# Patient Record
Sex: FEMALE | Race: WHITE | NOT HISPANIC OR LATINO | URBAN - METROPOLITAN AREA
[De-identification: names, ages, dates, MRNs, and addresses within clinical notes are randomized per-mention and may not be internally consistent; named-entity substitution may affect disease eponyms.]

---

## 2022-03-29 ENCOUNTER — LAB REQUISITION (OUTPATIENT)
Dept: LAB | Facility: HOSPITAL | Age: 67
End: 2022-03-29

## 2022-03-29 DIAGNOSIS — M65.30 TRIGGER FINGER, UNSPECIFIED FINGER: ICD-10-CM

## 2022-03-29 PROCEDURE — 88305 TISSUE EXAM BY PATHOLOGIST: CPT | Performed by: PLASTIC SURGERY

## 2022-03-30 LAB
CYTO UR: NORMAL
LAB AP CASE REPORT: NORMAL
LAB AP CLINICAL INFORMATION: NORMAL
PATH REPORT.FINAL DX SPEC: NORMAL
PATH REPORT.GROSS SPEC: NORMAL

## 2024-07-31 PROBLEM — M19.90 OSTEOARTHRITIS: Status: ACTIVE | Noted: 2024-07-31

## 2024-08-01 PROBLEM — M79.10 MYALGIA: Status: ACTIVE | Noted: 2024-08-01

## 2024-08-01 PROBLEM — M85.89 OSTEOPENIA OF MULTIPLE SITES: Status: ACTIVE | Noted: 2024-08-01

## 2024-08-01 PROBLEM — M19.90 OSTEOARTHROPATHY: Status: ACTIVE | Noted: 2024-08-01

## 2024-08-01 NOTE — ASSESSMENT & PLAN NOTE
Hands, knees, spine, feet , shoulder.  Currently c/o pain in R knee and R shoulder.  Positive R lateral knee pain, R thumb,  Xray r shoulder-degenerative change of the R AC joint and significant narrowing of the glenohumeral  joint consistent with degenerative arthritis  Xray R knee- patella femoral narrowing   Hand film: degenerative changes  of the DIP and CMC joint     Plan:    Comfort cool splints.   Biofreeze, CBD, Voltaren gel, Tiger Balm.   Tylenol Arthritis 2 tablets bid prn.   Continue Live Well.   If her pain worsens she can call and ask to schedule injection.

## 2024-08-01 NOTE — ASSESSMENT & PLAN NOTE
Dexa scan 2/21 Tscore Spine L1 -1.2, L4 is -1.6. L femoral neck -2.0, R femoral neck -2.0; L total hip -1.3.    Plan:  We discussed treatment options today.  Calcium 800-1000mg daily. Not to exceed 1200mg. Split dose  Vitamin D3 gel caps OTC 1000IU daily.   Weight bearing exercise.   Per the pt she had recent DEXA with PCP.

## 2024-08-01 NOTE — ASSESSMENT & PLAN NOTE
Cramping that occurs mostly at night.  Bilateral LE to thighs- currently doing well     Plan:    She stopped her statin and started taking potassium with much improvement.

## 2024-08-02 ENCOUNTER — OFFICE VISIT (OUTPATIENT)
Age: 69
End: 2024-08-02
Payer: COMMERCIAL

## 2024-08-02 VITALS
TEMPERATURE: 97.8 F | SYSTOLIC BLOOD PRESSURE: 112 MMHG | WEIGHT: 118 LBS | HEIGHT: 59 IN | BODY MASS INDEX: 23.79 KG/M2 | HEART RATE: 68 BPM | DIASTOLIC BLOOD PRESSURE: 78 MMHG

## 2024-08-02 DIAGNOSIS — M85.89 OSTEOPENIA OF MULTIPLE SITES: ICD-10-CM

## 2024-08-02 DIAGNOSIS — M79.10 MYALGIA: ICD-10-CM

## 2024-08-02 DIAGNOSIS — M19.90 OSTEOARTHROPATHY: Primary | ICD-10-CM

## 2024-08-02 NOTE — PROGRESS NOTES
[facility] [place] [facility] Follow Up Visit     Office Follow Up      Date: 08/02/2024   Patient Name: Nanci Bell  MRN: [id number]  YOB: 1955    Referring Physician: Mariaa Overton MD     Chief Complaint   Patient presents with    Follow-up    Osteoarthritis       History of Present Illness: Nanci Bell is a 69 y.o. female who is here today for follow up on   History of Present Illness  The patient presents for evaluation of multiple medical concerns.    The patient reports experiencing morning stiffness, which subsides upon movement. However, prolonged periods of sitting induce a sensation of gimpiness upon standing. She quantifies her pain as 2 out of 10 today, primarily localized to the side of her hip. The pain intensifies upon awakening, particularly after sleeping on her left side. She speculates that prolonged sitting in one position exacerbates the pain.    The patient experienced an episode of severe cramping last night, which persisted despite her efforts to walk. She speculates that this may be related to her depletion of her calcium supplement supply. She has been managing her symptoms with Caltrate.    The patient has a history of osteoarthritis in her hands, knees, spine, feet, and shoulder. She reports discomfort in her right thumb, with the right hand being more affected than the left. She has attempted to use a compression glove, but found it unhelpful. She experiences intermittent shooting pains, which resolve spontaneously. She does not use Tylenol Arthritis.    The patient maintains a daily walking regimen, albeit inconsistently.    Supplemental Information  She always has hyperlipidemia.       Result Review :        Results            Subjective     Allergies   Allergen Reactions    Codeine Provider Review Needed         Current Outpatient Medications:     fexofenadine (ALLEGRA) 180 MG tablet, Take 1 tablet by mouth Daily., Disp: , Rfl:      traMADol (ULTRAM) 50 MG tablet, Take 1 tablet by mouth Every 6 (Six) Hours As Needed for pain (Patient not taking: Reported on 2024), Disp: 15 tablet, Rfl: 0    Past Medical History:   Diagnosis Date    Anemia     Arthritis     High cholesterol     Nocturia     Osteopenia         Past Surgical History:   Procedure Laterality Date    HYSTERECTOMY      TRIGGER FINGER RELEASE Left     LEFT 3RD FINGER REPAIR    WISDOM TOOTH EXTRACTION         Family History   Problem Relation Age of Onset    Hypertension Mother     Arthritis Mother     Hypertension Brother     Diabetes Maternal Grandfather     Pancreatic cancer Paternal Grandmother     Other (LYMPHOAMTOID PAPULOSIS) Other         Social History     Socioeconomic History    Marital status:    Tobacco Use    Smoking status: Former     Types: Cigarettes     Start date:      Quit date:      Years since quittin.6     Passive exposure: Past    Smokeless tobacco: Never   Vaping Use    Vaping status: Never Used   Substance and Sexual Activity    Alcohol use: Yes     Comment: rare    Drug use: Never    Sexual activity: Defer       Review of Systems   Constitutional:  Positive for unexpected weight gain. Negative for fatigue and fever.   HENT:  Negative for mouth sores, nosebleeds, swollen glands and trouble swallowing.    Eyes:  Negative for blurred vision, double vision, photophobia, pain and visual disturbance.   Respiratory:  Negative for apnea, cough, choking and shortness of breath.    Cardiovascular:  Negative for chest pain, palpitations and leg swelling.        Leg cramping    Gastrointestinal:  Negative for abdominal pain, blood in stool, constipation, diarrhea, nausea, vomiting and GERD.   Endocrine: Negative for cold intolerance, heat intolerance, polydipsia, polyphagia and polyuria.   Genitourinary:  Negative for difficulty urinating, dysuria, genital sores, hematuria and urinary incontinence.   Musculoskeletal:  Positive for neck stiffness.  Negative for arthralgias, back pain, gait problem, joint swelling, myalgias, neck pain and bursitis.   Skin:  Negative for rash.   Allergic/Immunologic: Negative for environmental allergies and food allergies.   Neurological:  Negative for dizziness, tremors, seizures, syncope, weakness, numbness, headache, memory problem and confusion.   Hematological:  Negative for adenopathy. Does not bruise/bleed easily.   Psychiatric/Behavioral:  Negative for sleep disturbance, suicidal ideas, depressed mood and stress. The patient is nervous/anxious.         I have reviewed and updated the patient's chief complaint, history of present illness, review of systems, past medical history, surgical history, family history, social history, medications and allergy list as appropriate.     Objective    Vital Signs:   There were no vitals filed for this visit.  Nanci Bell reports a pain score of .  Given her pain assessment as noted, treatment options were discussed and the following options were decided upon as a follow-up plan to address the patient's pain: .      There is no height or weight on file to calculate BMI.  BMI cannot be calculated due to outdated height or weight values.  Please input a current height/weight in Vitals and re-renter BMIFOLLOWUP in Note to pull in correct documentation based on BMI range.      Physical Exam   Physical Exam  Patient is an alert female, in no acute distress.  Head is atraumatic, normocephalic. Pupils are round and equal. Extraocular muscles are intact. Oropharynx appears normal.  Lungs are clear.  Heart is regular without rubs, gallops, or murmurs.  Thoracolumbar spine is nontender. Shoulders have good range of motion. Squaring of the first CMC joints bilaterally and Heberden's nodes are present. Thumb subluxation is present bilaterally, worse on the left. Crepitus is observed, especially in the right knee.  No acute rash is present.    Physical Exam  There is currently no information  documented on the homunculus. Go to the Rheumatology activity and complete the homunculus joint exam.     Results Review:   Imaging Results (Last 24 Hours)       ** No results found for the last 24 hours. **            Procedures    Assessment / Plan    Assessment/Plan:   Diagnoses and all orders for this visit:    1. Osteoarthropathy (Primary)  Assessment & Plan:  Hands, knees, spine, feet , shoulder.  Currently c/o pain in R knee and R shoulder.  Positive R lateral knee pain, R thumb,  Xray r shoulder-degenerative change of the R AC joint and significant narrowing of the glenohumeral  joint consistent with degenerative arthritis  Xray R knee- patella femoral narrowing   Hand film: degenerative changes  of the DIP and CMC joint     Plan:    Comfort cool splints.   Biofreeze, CBD, Voltaren gel, Tiger Balm.   Tylenol Arthritis 2 tablets bid prn.   Continue Live Well.   If her pain worsens she can call and ask to schedule injection.         2. Myalgia  Assessment & Plan:  Cramping that occurs mostly at night.  Bilateral LE to thighs- currently doing well     Plan:    She stopped her statin and started taking potassium with much improvement.       3. Osteopenia of multiple sites  Assessment & Plan:  Dexa scan 2/21 Tscore Spine L1 -1.2, L4 is -1.6. L femoral neck -2.0, R femoral neck -2.0; L total hip -1.3.    Plan:  We discussed treatment options today.  Calcium 800-1000mg daily. Not to exceed 1200mg. Split dose  Vitamin D3 gel caps OTC 1000IU daily.   Weight bearing exercise.   Per the pt she had recent DEXA with PCP.             Assessment & Plan  1. Myalgias.  The patient recently experienced Charley horses in her lower extremities. She has been temporarily discontinuing her calcium supplements, which she is advised to resume. Dietary modifications, including consumption of bananas and maintaining hydration, are also recommended. Should the Charley horses recur, it is recommended that she consumes 4 ounces of tonic  water at bedtime.    2. Osteoarthritis.  She is advised to use Comfort Cool thumb splints. Topical treatments such as Tiger Balm, diclofenac gel, Biofreeze, and CBD are also recommended. Tylenol Arthritis, two tablets twice daily as needed, is also recommended. Should the pain intensify, injections may be considered.    3. Osteopenia.  Her osteopenia is managed by her primary care physician. Weightbearing exercises such as walking are recommended. Calcium supplementation is 800 to 1000 mg daily, not exceeding 1200 mg daily, divided into morning and evening doses. Vitamin D3 supplementation is 1000 units daily.        Follow Up:   No follow-ups on file.         Deloris Thompson MD  [facility] [place] [facility]     Encounter Administratively Closed by Health Information Management

## 2025-08-04 ENCOUNTER — OFFICE VISIT (OUTPATIENT)
Age: 70
End: 2025-08-04
Payer: COMMERCIAL

## 2025-08-04 VITALS
DIASTOLIC BLOOD PRESSURE: 78 MMHG | BODY MASS INDEX: 23.99 KG/M2 | TEMPERATURE: 97.1 F | HEIGHT: 59 IN | HEART RATE: 71 BPM | SYSTOLIC BLOOD PRESSURE: 110 MMHG | WEIGHT: 119 LBS

## 2025-08-04 DIAGNOSIS — M85.89 OSTEOPENIA OF MULTIPLE SITES: ICD-10-CM

## 2025-08-04 DIAGNOSIS — M79.10 MYALGIA: Primary | ICD-10-CM

## 2025-08-04 DIAGNOSIS — M19.90 OSTEOARTHROPATHY: ICD-10-CM

## 2025-08-04 RX ORDER — IBUPROFEN 600 MG/1
TABLET ORAL
COMMUNITY
Start: 2025-06-03 | End: 2025-08-04